# Patient Record
(demographics unavailable — no encounter records)

---

## 2024-10-29 NOTE — ASSESSMENT
[FreeTextEntry1] : Vitals taken and were stable at this time. Device SN: [id number] Pt using [employer] AirSense 11 PAP machine and it is currently set at 5-15 cm H2O.   Dr. Parker reviewed with Cesar his CPAP compliance in office today. Cesar is both compliant and benefiting from CPAP usage.   Cesar should begin taking mirtazapine 7.5mg for better maintenance insomnia management.   Medications reviewed. Continue present medications.  Cesar should follow up in 1 month.

## 2024-10-29 NOTE — ADDENDUM
----- Message from YOHAN Tee CNP sent at 6/15/2022 12:16 PM EDT -----  Urine culture is positive for infection.  Finish cipro as prescribed [FreeTextEntry1] : I, Tacho Lilo, acted solely as a scribe for Dr. Marlin Parker D.O. on this date 10/29/2024.   All medical record entries made by the Scribe were at my, Dr. Marlin Parker D.O., direction and personally dictated by me on 10/29/2024. I have reviewed the chart and agree that the record accurately reflects my personal performance of the history, physical exam, assessment and plan. I have also personally directed, reviewed, and agreed with the chart.

## 2025-01-12 NOTE — HISTORY OF PRESENT ILLNESS
[FreeTextEntry1] : CHANTE CADET is a 79 year old male, with history of afib, TIAGO, asthma and CAD who presents to the office for follow up sleep evaluation. CHANTE is overall feeling well at this time; no respiratory complaints. Chante notes he is sleeping well but he is still struggling to wake up energized. Chante notes that he is getting an ablation in the middle of November. Chante further complains of maintenance insomnia. Chante notes that he has had nocturia even before his enlarged prostate. s/p RFA for a fib recently

## 2025-01-12 NOTE — ASSESSMENT
[FreeTextEntry1] : 78 yo pt presents for sleep follow up.  Vitals taken and were stable at this time. Device SN: [id number] Pt using [employer] AirSense 11 PAP machine and it is currently set at 5-15 cm H2O.   Dr. Parker reviewed with Cesar his CPAP compliance in office today. Cesar is both compliant and benefiting from CPAP usage.   - AHI = 0.1 - Pt to follow up with cardiologist   Insomnia - Discontinue Mirtazapine - Rx zelaplon 5 mg (1 pill 1x per day at night) for insomnia. - Pt to monitor insomnia progress  RTO in 3 months for follow up.

## 2025-01-12 NOTE — ASSESSMENT
[FreeTextEntry1] : 78 yo pt presents for sleep follow up.  Vitals taken and were stable at this time. Device SN: [id number] Pt using [employer] AirSense 11 PAP machine and it is currently set at 5-15 cm H2O.   Dr. Parker reviewed with Cesar his CPAP compliance in office today. Cesar is both compliant and benefiting from CPAP usage.   - AHI = 0.1 - Pt to follow up with cardiologist   Insomnia - Discontinue Mirtazapine - Rx zelaplon 5 mg (1 pill 1x per day at night) for insomnia. - Pt to monitor insomnia progress  RTO in 3 months for follow up.

## 2025-01-12 NOTE — END OF VISIT
[FreeTextEntry3] : I, Brianalessandro Sorensen, acted as a scribe on behalf of Dr. Marlin Parker D.O. on 01/10/2025.   All medical entries made by the scribe were at my, Dr. Marlin Parker D.O., direction and personally dictated by me on 01/10/2025 . I have reviewed the chart and agree that the record accurately reflects my personal performance of the history, physical exam, assessment and plan. I have also personally directed, reviewed, and agreed with the chart.

## 2025-02-14 NOTE — ASSESSMENT
[FreeTextEntry1] : Dr. Parker reviewed with Cesar his CPAP compliance in office today. Cesar is both compliant and benefiting from CPAP usage.   Dr. Parker's recommendation: Cesar should continue to be compliant with CPAP for sleep apnea treatment. Cesar should continue to take Zaleplon 5mg one capsule at bedtime PRN for maintenance insomnia. Return for sleep follow up in

## 2025-02-14 NOTE — HISTORY OF PRESENT ILLNESS
[FreeTextEntry1] : CHANTE CADET is a 79 year old male, with history of A-Fib s/p RFA, TIAGO, asthma and CAD who presents to the office for follow up sleep evaluation. CHANTE is overall feeling well at this time; no respiratory complaints. Chante notes that Zaleplon has helped with his maintenance insomnia.

## 2025-02-14 NOTE — SIGNATURES
[TextEntry] : This note was written by Pat Hoffman on 02/14/2025 acting as medical scribe for Dr. Marlin Parker. I, Dr. Marlin Parker, have read and attest that all the information, medical decision making and discharge instructions within are true and accurate.

## 2025-05-30 NOTE — HISTORY OF PRESENT ILLNESS
[FreeTextEntry1] : CHANTE CADET is a 80 year old male, with history of A-Fib s/p RFA, TIAGO, asthma and CAD who presents to the office for follow up sleep evaluation. CHANTE reports to be feeling reasonably well at this time with no respiratory or sleep complaints. CHANTE notes that he had a watchman device implanted in his left atrium last week. Chante states that he occasionally still takes Zaleplon and it has helped with his maintenance insomnia; also reports his compliance in using his CPAP device and is tolerating it well.

## 2025-05-30 NOTE — SIGNATURES
[TextEntry] : This note was written by Rajan Alejandra on 05/30/2025 acting as medical scribe for Dr. Marlin Parker. I, Dr. Malrin Parker, have read and attest that all the information, medical decision making and discharge instructions within are true and accurate.

## 2025-05-30 NOTE — ASSESSMENT
[FreeTextEntry1] : Dr. Parker reviewed recent chest x-ray scan completed prior to his watchman implant procedure. The study showed to be stable.   Dr. Parker reviewed with Chante his CPAP compliance in office today. Chante is both compliant and benefiting from CPAP usage.   Educated CHANTE that to achieve compliance he has to be using CPAP device at minimum 4HRS per night, 70% of nights. Again, acknowledged understanding.  Dr. Parker's recommendation: At this point, CHANTE should continue to use his CPAP machine for sleep apnea treatment. Chante should continue to take Zaleplon 5 mg, one capsule at bedtime as needed, for maintenance insomnia. CHANTE should return for a sleep follow up visit in 3 months.   Cardiology follow-up for history of CAD/atrial fibrillation.